# Patient Record
(demographics unavailable — no encounter records)

---

## 2025-06-05 NOTE — HISTORY OF PRESENT ILLNESS
[FreeTextEntry1] : 50 yo  presents for IUD check.  She is concerned about perimenopausal symptoms, she is having problems sleeping, anxiety.  She reports occasional spotting.  IUS since .

## 2025-06-05 NOTE — PLAN
[FreeTextEntry1] : 50 yo  presents for IUD check - IUD appropriately placed @ fundus - reviewed treatment options for perimenopausal symptoms - discussed low dose paxil for anxiety/sleeping problems vs. HRT - recommend consultation with Dr. Robison for further discussion - all questions/concerns addressed to pt satisfaction

## 2025-06-05 NOTE — HISTORY OF PRESENT ILLNESS
[FreeTextEntry1] : 52 yo  presents for IUD check.  She is concerned about perimenopausal symptoms, she is having problems sleeping, anxiety.  She reports occasional spotting.  IUS since .